# Patient Record
Sex: FEMALE | Race: WHITE | NOT HISPANIC OR LATINO
[De-identification: names, ages, dates, MRNs, and addresses within clinical notes are randomized per-mention and may not be internally consistent; named-entity substitution may affect disease eponyms.]

---

## 2018-04-29 ENCOUNTER — TRANSCRIPTION ENCOUNTER (OUTPATIENT)
Age: 33
End: 2018-04-29

## 2018-06-03 ENCOUNTER — TRANSCRIPTION ENCOUNTER (OUTPATIENT)
Age: 33
End: 2018-06-03

## 2018-09-06 ENCOUNTER — TRANSCRIPTION ENCOUNTER (OUTPATIENT)
Age: 33
End: 2018-09-06

## 2018-09-18 ENCOUNTER — TRANSCRIPTION ENCOUNTER (OUTPATIENT)
Age: 33
End: 2018-09-18

## 2019-01-30 ENCOUNTER — TRANSCRIPTION ENCOUNTER (OUTPATIENT)
Age: 34
End: 2019-01-30

## 2021-03-02 ENCOUNTER — TRANSCRIPTION ENCOUNTER (OUTPATIENT)
Age: 36
End: 2021-03-02

## 2021-03-14 ENCOUNTER — TRANSCRIPTION ENCOUNTER (OUTPATIENT)
Age: 36
End: 2021-03-14

## 2021-05-29 ENCOUNTER — TRANSCRIPTION ENCOUNTER (OUTPATIENT)
Age: 36
End: 2021-05-29

## 2021-06-08 ENCOUNTER — TRANSCRIPTION ENCOUNTER (OUTPATIENT)
Age: 36
End: 2021-06-08

## 2021-06-10 PROBLEM — Z00.00 ENCOUNTER FOR PREVENTIVE HEALTH EXAMINATION: Status: ACTIVE | Noted: 2021-06-10

## 2021-06-15 ENCOUNTER — APPOINTMENT (OUTPATIENT)
Dept: ORTHOPEDIC SURGERY | Facility: CLINIC | Age: 36
End: 2021-06-15
Payer: MEDICAID

## 2021-06-15 VITALS — BODY MASS INDEX: 35.85 KG/M2 | WEIGHT: 210 LBS | RESPIRATION RATE: 16 BRPM | HEIGHT: 64 IN

## 2021-06-15 DIAGNOSIS — Z87.898 PERSONAL HISTORY OF OTHER SPECIFIED CONDITIONS: ICD-10-CM

## 2021-06-15 DIAGNOSIS — Z87.09 PERSONAL HISTORY OF OTHER DISEASES OF THE RESPIRATORY SYSTEM: ICD-10-CM

## 2021-06-15 DIAGNOSIS — Z78.9 OTHER SPECIFIED HEALTH STATUS: ICD-10-CM

## 2021-06-15 DIAGNOSIS — F43.10 POST-TRAUMATIC STRESS DISORDER, UNSPECIFIED: ICD-10-CM

## 2021-06-15 DIAGNOSIS — Z87.891 PERSONAL HISTORY OF NICOTINE DEPENDENCE: ICD-10-CM

## 2021-06-15 DIAGNOSIS — S61.219A LACERATION W/OUT FOREIGN BODY OF UNSPECIFIED FINGER W/OUT DAMAGE TO NAIL, INITIAL ENCOUNTER: ICD-10-CM

## 2021-06-15 DIAGNOSIS — E28.2 POLYCYSTIC OVARIAN SYNDROME: ICD-10-CM

## 2021-06-15 PROCEDURE — 99204 OFFICE O/P NEW MOD 45 MIN: CPT

## 2021-06-15 PROCEDURE — 73130 X-RAY EXAM OF HAND: CPT | Mod: LT

## 2021-06-18 RX ORDER — ACETAMINOPHEN AND CODEINE 300; 30 MG/1; MG/1
300-30 TABLET ORAL 4 TIMES DAILY
Qty: 20 | Refills: 0 | Status: ACTIVE | COMMUNITY
Start: 2021-06-18 | End: 1900-01-01

## 2021-06-22 ENCOUNTER — APPOINTMENT (OUTPATIENT)
Dept: ORTHOPEDIC SURGERY | Facility: CLINIC | Age: 36
End: 2021-06-22
Payer: MEDICAID

## 2021-06-22 PROCEDURE — ZZZZZ: CPT

## 2021-06-22 NOTE — HISTORY OF PRESENT ILLNESS
[Right] : right hand dominant [FreeTextEntry1] : After an unsuccessful attempt at talking with the patient several hours ago, another attempt was made to discuss the next steps in treatment with the patient and her mother, Kiki, who helped relieve some of the patient's anxiety.\par \par

## 2021-06-22 NOTE — ASSESSMENT
[FreeTextEntry1] : I explained that with a further delay in surgery, there is increased risk for a suboptimal outcome following flexor tendon and digital nerve repair, especially now that it has been close to 4 weeks from the date of injury. I explained that there is a diminished chance of obtaining a tensionless primary repair, both for the tendon and the nerve, which would warrant a reconstruction using autograft (for the tendon) and allograft (for the nerve). I explained that there would be a lower likelihood of regaining near symmetric left long DIP joint motion as well as regaining sensation to the ulnar aspect of her affected fingertip. I explained the nerve repair would be more for obtaining nerve pain control and prevention of neuroma formation. I explained that postoperative rehabilitation would require dedicated and diligent compliance to both attending therapy and performing home exercises. This would entail working hard, through postoperative discomfort, pain and swelling in order to regain the most optimal potential outcome. I expressed my concern for this given the patient canceled surgery last minute yesterday afternoon. I also discussed consideration for non-operative management. I explained this would lead to an inability to flex the DIP joint of the affected finger, however, there would be potential for good range of motion of the MP and PIP joints with the assistance of a hand therapy program. Regarding her nerve injury, I explained that nonoperative treatment may lead to neuroma formation, heightened sensitivity and nerve pain. I did note that working with a hand therapist & performing desensitization exercises may help alleviate some of that discomfort and pain as well. This, I noted, is more unpredictable. I thoroughly explained the inherent risks and potential complications of surgery including, but not limited to stiffness, loss of motion, infection, wound healing issues, nerve pain, postoperative pain, CRPS, excessive scar formation, failure of nerve and/or tendon repair, & the potential need for revision surgery or reoperation the future. The patient and her mother well understood what is involved. They will contact the office with a decision on whether they would like to proceed with operative or nonoperative management. All questions were answered.\par

## 2021-06-28 ENCOUNTER — APPOINTMENT (OUTPATIENT)
Dept: ORTHOPEDIC SURGERY | Facility: AMBULATORY SURGERY CENTER | Age: 36
End: 2021-06-28

## 2021-07-09 ENCOUNTER — APPOINTMENT (OUTPATIENT)
Dept: ORTHOPEDIC SURGERY | Facility: CLINIC | Age: 36
End: 2021-07-09